# Patient Record
Sex: MALE | Race: WHITE | HISPANIC OR LATINO | Employment: FULL TIME | URBAN - METROPOLITAN AREA
[De-identification: names, ages, dates, MRNs, and addresses within clinical notes are randomized per-mention and may not be internally consistent; named-entity substitution may affect disease eponyms.]

---

## 2021-03-20 ENCOUNTER — HOSPITAL ENCOUNTER (EMERGENCY)
Facility: MEDICAL CENTER | Age: 26
End: 2021-03-20
Attending: EMERGENCY MEDICINE | Admitting: EMERGENCY MEDICINE

## 2021-03-20 VITALS
OXYGEN SATURATION: 99 % | RESPIRATION RATE: 12 BRPM | SYSTOLIC BLOOD PRESSURE: 117 MMHG | TEMPERATURE: 98 F | HEART RATE: 58 BPM | HEIGHT: 72 IN | DIASTOLIC BLOOD PRESSURE: 72 MMHG

## 2021-03-20 DIAGNOSIS — K04.7 DENTAL INFECTION: ICD-10-CM

## 2021-03-20 PROCEDURE — 700102 HCHG RX REV CODE 250 W/ 637 OVERRIDE(OP): Performed by: EMERGENCY MEDICINE

## 2021-03-20 PROCEDURE — 96372 THER/PROPH/DIAG INJ SC/IM: CPT

## 2021-03-20 PROCEDURE — A9270 NON-COVERED ITEM OR SERVICE: HCPCS | Performed by: EMERGENCY MEDICINE

## 2021-03-20 PROCEDURE — 700111 HCHG RX REV CODE 636 W/ 250 OVERRIDE (IP): Performed by: EMERGENCY MEDICINE

## 2021-03-20 PROCEDURE — 99283 EMERGENCY DEPT VISIT LOW MDM: CPT

## 2021-03-20 RX ORDER — CLINDAMYCIN HYDROCHLORIDE 150 MG/1
300 CAPSULE ORAL ONCE
Status: COMPLETED | OUTPATIENT
Start: 2021-03-20 | End: 2021-03-20

## 2021-03-20 RX ORDER — CLINDAMYCIN HYDROCHLORIDE 300 MG/1
300 CAPSULE ORAL 3 TIMES DAILY
Qty: 30 CAPSULE | Refills: 0 | Status: SHIPPED | OUTPATIENT
Start: 2021-03-20 | End: 2021-03-30

## 2021-03-20 RX ORDER — KETOROLAC TROMETHAMINE 30 MG/ML
30 INJECTION, SOLUTION INTRAMUSCULAR; INTRAVENOUS ONCE
Status: COMPLETED | OUTPATIENT
Start: 2021-03-20 | End: 2021-03-20

## 2021-03-20 RX ORDER — NAPROXEN 500 MG/1
500 TABLET ORAL 2 TIMES DAILY WITH MEALS
Qty: 30 TABLET | Refills: 0 | Status: SHIPPED | OUTPATIENT
Start: 2021-03-20

## 2021-03-20 RX ADMIN — KETOROLAC TROMETHAMINE 30 MG: 30 INJECTION, SOLUTION INTRAMUSCULAR at 22:25

## 2021-03-20 RX ADMIN — CLINDAMYCIN HYDROCHLORIDE 300 MG: 150 CAPSULE ORAL at 22:25

## 2021-03-20 ASSESSMENT — ENCOUNTER SYMPTOMS: FEVER: 0

## 2021-03-21 NOTE — ED NOTES
ERP rounded at bedside. Discharge instructions and follow up care discussed with patient. Patient given time to ask questions and verbalized understanding. Patient given 2 new prescriptions. Patient AOx4 at discharge and ambulatory to lobby with steady gait.

## 2021-03-21 NOTE — ED TRIAGE NOTES
Chief Complaint   Patient presents with   • Dental Pain     R upper jaw. Pt admits to having cavity in this area and pain associated with eating.      Pt arrived for above c/o. Pt admits buying pain medication and antibiotics from a person who is not a doctor for his dental pain.     /76   Pulse (!) 55   Temp 36.6 °C (97.8 °F) (Temporal)   Resp 14   Ht 1.829 m (6')   SpO2 99%

## 2021-03-21 NOTE — ED PROVIDER NOTES
"ED Provider Note    Scribed for Paola Babcock M.D. by Earline Hernandes. 3/20/2021, 9:38 PM.    Primary care provider: No primary care provider noted.  Means of arrival: Walk-in  History obtained from: Patient  History limited by: None    CHIEF COMPLAINT  Chief Complaint   Patient presents with   • Dental Pain     R upper jaw. Pt admits to having cavity in this area and pain associated with eating.        HPI  Mio Cody is a 25 y.o. male who presents to the Emergency Department for evaluation of acute right upper dental pain onset several weeks prior to arrival. He has had a cavity in his right second molar for about three years but has not seen a dentist recently. He reports taking Penicillin that he got from \"a store\" without relief.  Tylenol has been used with minimal alleviation. No fever.     REVIEW OF SYSTEMS  Review of Systems   Constitutional: Negative for fever.   HENT:        Positive for dental pain.       PAST MEDICAL HISTORY   none pertinent    SURGICAL HISTORY  patient denies any surgical history    SOCIAL HISTORY  Social History     Tobacco Use   • Smoking status: Current Every Day Smoker   • Smokeless tobacco: Never Used   Substance Use Topics   • Alcohol use: Not Currently   • Drug use: Not Currently      Social History     Substance and Sexual Activity   Drug Use Not Currently       FAMILY HISTORY  None pertinent    CURRENT MEDICATIONS  Home Medications     Reviewed by Susy Koch R.N. (Registered Nurse) on 03/20/21 at 2120  Med List Status: Not Addressed   Medication Last Dose Status        Patient Rod Taking any Medications                       ALLERGIES  No Known Allergies    PHYSICAL EXAM  VITAL SIGNS: /76   Pulse (!) 55   Temp 36.6 °C (97.8 °F) (Temporal)   Resp 14   Ht 1.829 m (6')   SpO2 99%   Vitals reviewed by myself.  Nursing note and vitals reviewed.  Constitutional: Well-developed and well-nourished. Uncomfortable appearing.  HENT: Head is normocephalic and " atraumatic.  No facial swelling noted.  Right upper molar noted to have cavity filling.  No gumline swelling or induration  Eyes: extra-ocular movements intact  Cardiovascular: Bradycardic rate and regular rhythm. No murmur heard.  Pulmonary/Chest: Breath sounds normal. No wheezes or rales.   Musculoskeletal: Extremities exhibit normal range of motion without edema or tenderness.   Neurological: Awake and alert  Skin: Skin is warm and dry. No rash.       REASSESSMENT    9:38 PM - Patient seen and examined at bedside. Discussed plan of care, including pain medication and antibiotics. Patient agrees to the plan of care. The patient will be medicated with Cleocin and Toradol.     11:15 PM - Patient was reevaluated at bedside. His pain has improved. Discussed discharge instructions and return precautions with the patient and they were cleared for discharge. Patient was given the opportunity to ask any further questions. He is comfortable with discharge at this time.      COURSE & MEDICAL DECISION MAKING  Nursing notes, VS, PMSFHx reviewed in chart.    Patient is a 25-year-old male who comes in for evaluation of dental pain.  No evidence of gumline abscess or facial abscess on exam.  His exam is most consistent with likely dental caries and possible periapical abscess.  He is otherwise well-appearing with vitals and normal limits.  I advised patient that we will start him on clindamycin and naproxen for management of his symptoms and have him follow-up with outpatient dentist.  Patient is amenable to this plan.  He is treated in the emergency department after which he feels improved.  He has been given strict return precautions and discharged in stable condition.      The patient will return for new or worsening symptoms and is stable at the time of discharge.    The patient is referred to a primary physician for blood pressure management, diabetic screening, and for all other preventative health  concerns.    DISPOSITION:  Patient will be discharged home in stable condition.    FOLLOW UP:  [address] [address] [phone]          OUTPATIENT MEDICATIONS:  New Prescriptions    CLINDAMYCIN (CLEOCIN) 300 MG CAP    Take 1 capsule by mouth 3 times a day for 10 days.    NAPROXEN (NAPROSYN) 500 MG TAB    Take 1 tablet by mouth 2 times a day, with meals.       FINAL IMPRESSION  1. Dental infection          Earline HENDRICKS (Mirza), am scribing for, and in the presence of, Paola Babcock M.D..    Electronically signed by: Earline Hernandes (Mirza), 3/20/2021    Paola HENDRICKS M.D. personally performed the services described in this documentation, as scribed by Earline Hernandes in my presence, and it is both accurate and complete.    The note accurately reflects work and decisions made by me.  Paola Babcock M.D.  3/21/2021  3:55 AM

## 2021-03-21 NOTE — ED NOTES
Pt reports improvement in pain following toradol admin. Dental resource list provided to patient.

## 2021-07-06 ENCOUNTER — HOSPITAL ENCOUNTER (EMERGENCY)
Facility: MEDICAL CENTER | Age: 26
End: 2021-07-06
Attending: EMERGENCY MEDICINE

## 2021-07-06 VITALS
BODY MASS INDEX: 17.86 KG/M2 | WEIGHT: 131.84 LBS | DIASTOLIC BLOOD PRESSURE: 74 MMHG | TEMPERATURE: 98 F | HEART RATE: 79 BPM | SYSTOLIC BLOOD PRESSURE: 108 MMHG | RESPIRATION RATE: 16 BRPM | HEIGHT: 72 IN | OXYGEN SATURATION: 98 %

## 2021-07-06 DIAGNOSIS — R39.89 GENITAL SORE: ICD-10-CM

## 2021-07-06 DIAGNOSIS — N34.2 URETHRITIS: ICD-10-CM

## 2021-07-06 DIAGNOSIS — A64 STI (SEXUALLY TRANSMITTED INFECTION): ICD-10-CM

## 2021-07-06 LAB — TREPONEMA PALLIDUM IGG+IGM AB [PRESENCE] IN SERUM OR PLASMA BY IMMUNOASSAY: NORMAL

## 2021-07-06 PROCEDURE — 99284 EMERGENCY DEPT VISIT MOD MDM: CPT | Mod: EDC

## 2021-07-06 PROCEDURE — 700111 HCHG RX REV CODE 636 W/ 250 OVERRIDE (IP): Performed by: EMERGENCY MEDICINE

## 2021-07-06 PROCEDURE — 96372 THER/PROPH/DIAG INJ SC/IM: CPT | Mod: EDC

## 2021-07-06 PROCEDURE — 87591 N.GONORRHOEAE DNA AMP PROB: CPT

## 2021-07-06 PROCEDURE — 700102 HCHG RX REV CODE 250 W/ 637 OVERRIDE(OP): Performed by: EMERGENCY MEDICINE

## 2021-07-06 PROCEDURE — 87491 CHLMYD TRACH DNA AMP PROBE: CPT

## 2021-07-06 PROCEDURE — 86780 TREPONEMA PALLIDUM: CPT

## 2021-07-06 PROCEDURE — A9270 NON-COVERED ITEM OR SERVICE: HCPCS | Performed by: EMERGENCY MEDICINE

## 2021-07-06 PROCEDURE — 36415 COLL VENOUS BLD VENIPUNCTURE: CPT

## 2021-07-06 RX ORDER — AZITHROMYCIN 250 MG/1
1000 TABLET, FILM COATED ORAL ONCE
Status: COMPLETED | OUTPATIENT
Start: 2021-07-06 | End: 2021-07-06

## 2021-07-06 RX ORDER — CEFTRIAXONE 500 MG/1
500 INJECTION, POWDER, FOR SOLUTION INTRAMUSCULAR; INTRAVENOUS ONCE
Status: COMPLETED | OUTPATIENT
Start: 2021-07-06 | End: 2021-07-06

## 2021-07-06 RX ADMIN — AZITHROMYCIN MONOHYDRATE 1000 MG: 250 TABLET ORAL at 11:21

## 2021-07-06 RX ADMIN — CEFTRIAXONE SODIUM 500 MG: 500 INJECTION, POWDER, FOR SOLUTION INTRAMUSCULAR; INTRAVENOUS at 11:21

## 2021-07-06 RX ADMIN — PENICILLIN G BENZATHINE 2.4 MILLION UNITS: 1200000 INJECTION, SUSPENSION INTRAMUSCULAR at 11:22

## 2021-07-06 NOTE — ED NOTES
[id number] used to translate discharge instructions.    Mio Cody has been discharged from the Emergency Room.    Discharge instructions, which include signs and symptoms to monitor at home for, as well as detailed information regarding STI provided.  All questions and concerns addressed at this time.      Patient leaves ER in no apparent distress. This RN provided education regarding returning to the ER for any new concerns or changes in patient's condition.      /74   Pulse 79   Temp 36.7 °C (98 °F) (Temporal)   Resp 16   Ht 1.829 m (6')   Wt 59.8 kg (131 lb 13.4 oz)   SpO2 98%   BMI 17.88 kg/m²

## 2021-07-06 NOTE — ED PROVIDER NOTES
ED Provider  Scribed for Brandt Dugan D.O. by Shari Hackett. 7/6/2021  10:36 AM    Means of arrival: Walk in   History obtained from: Patient  History limited by: Online [place] Interpretor used    CHIEF COMPLAINT  Chief Complaint   Patient presents with    Exposure to STD       HPI  Mio Cody is a 25 y.o. male who presents to the ED for a possible STD exposure with an onset of last week. He notes his female partner informed him that they may have been exposed to an STD, but her exposure was not confirmed. He describes he has some yellow and white colored discharge from his penis yesterday and feels a burning sensation when he urinates. He denies being seen for his symptoms in the past, and is requesting a STD test as a precaution. Exacerbating factors include urinating. No alleviating factors were identified. He reports associated dysuria, penile discharge x1 day, and a genital sore on his penis. He denies any associated fever, chills, or sweats.     REVIEW OF SYSTEMS  See HPI for further details. All other systems are otherwise negative.     PAST MEDICAL HISTORY   None noted    SOCIAL HISTORY  Social History     Tobacco Use    Smoking status: Current Every Day Smoker    Smokeless tobacco: Never Used   Substance and Sexual Activity    Alcohol use: Yes    Drug use: Yes     Comment: marijuana    Sexual activity: Female partner       SURGICAL HISTORY  patient denies any surgical history    CURRENT MEDICATIONS  Home Medications       Reviewed by Romain Campbell R.N. (Registered Nurse) on 07/06/21 at 1006  Med List Status: Complete     Medication Last Dose Status   naproxen (NAPROSYN) 500 MG Tab  Active                    ALLERGIES  No Known Allergies    PHYSICAL EXAM  VITAL SIGNS: /73   Pulse 82   Temp 36.1 °C (97 °F) (Temporal)   Resp 16   Ht 1.829 m (6')   Wt 59.8 kg (131 lb 13.4 oz)   SpO2 98%   BMI 17.88 kg/m²   Constitutional: Alert in no apparent distress.  HENT: No signs of  trauma.   Eyes: Conjunctiva normal, Non-icteric.   Neck: Normal range of motion, No tenderness, Supple.  Cardiovascular: Regular rate and rhythm, no murmurs.   Thorax & Lungs: No respiratory distress,   Abdomen: Bowel sounds normal, Soft, No tenderness, No masses, No pulsatile masses. No peritoneal signs.  : Cloudy white and yellow discharge from the penis, sore/ulceration approximately 2 mm at the anterior portion of the glands with no drainage, no inguinal adenopathy.   Skin: Warm, Dry, normal color.   Extremities: No edema, No tenderness.   Musculoskeletal: Good range of motion in all major joints. No tenderness to palpation or major deformities noted.   Neurologic: Alert and oriented x4, Normal motor function, Normal sensory function, No focal deficits noted.   Psychiatric: Affect normal, Judgment normal, Mood normal.     DIAGNOSTIC STUDIES / PROCEDURES    LABS  Results for orders placed or performed during the hospital encounter of 07/06/21   Chlamydia/GC PCR Urine Or Swab    Specimen: Urine, First Catch   Result Value Ref Range    Source Urine    T.PALLIDUM AB EIA   Result Value Ref Range    Syphilis, Treponemal Qual Non-Reactive Non-Reactive     All labs reviewed by me.    COURSE  Pertinent Labs & Imaging studies reviewed. (See chart for details)    10:36 AM - Patient seen and examined at bedside. Discussed plan of care. I informed him we will order labs to evaluate for STD's. He will also be treated with medications for possible STD's. Patient understands and agrees to plan of care.  Ordered for T. Pallidum AB EIA, and Chlamydia/GC PCR Urine to evaluate his symptoms.     10:52 AM - Patient will be treated with Zithromax 1000 mg tablet, Rocephin 500 mg injection, and Penicillin 2.4 unit injection for his STD exposure.     12:50 PM - Patient was reevaluated at bedside. He is resting in bed with no new complaints at this time. Discussed lab results with the patient and informed them that they were reassuring.  The patient will return for new or worsening symptoms and is stable at the time of discharge. He will follow up with [facility]. Patient verbalizes understanding and agreement to this plan of care.       MEDICAL DECISION MAKING  This is a 25 y.o. male who presents with urethral drainage and a sore on the glans of the penis.  This is concerning for urethritis, possibly syphilis.  He is treated for all 3.  While cultures are pending.    DISPOSITION:  Patient will be discharged home in stable condition.    FOLLOW UP:  [address] [address] [phone]  In 1 week    FINAL IMPRESSION  1. STI (sexually transmitted infection)    2. Urethritis    3. Genital sore         IShari (Scribe), am scribing for, and in the presence of, Brandt Dugan D.O..    Electronically signed by: Shari Hackett (Scribe), 7/6/2021    IBrandt D.O. personally performed the services described in this documentation, as scribed by Shari Hackett in my presence, and it is both accurate and complete.    C    The note accurately reflects work and decisions made by me.  Brandt Dugan D.O.  7/6/2021  5:43 PM

## 2021-07-06 NOTE — ED TRIAGE NOTES
Patient ambulatory to triage with with c/o STD exposure. Patient reports white-yellow penile discharge x 2 days.    Patient given urine specimen supplies.   Patient to lobby and instructed to inform staff of any needs.      #[id number]

## 2021-07-06 NOTE — DISCHARGE INSTRUCTIONS
No sex or alcohol for 2 weeks to give this some time to resolve.  Always use a condom when you have sex to prevent this from reoccurring.

## 2021-07-06 NOTE — ED NOTES
"First interaction with patient.  Assumed care at this time.   [id number] used to translate interaction.  Patient presents to the ER today for concerns of an STI.  He reports penile discharge starting yesterday.  He is requesting \"all of the tests,\" include hepatitis, chlamydia, syphilis, and gonorrhea.  Urine collected and sent to lab.  Patient verified correct patient name and  on labeled specimen.  Patient informed of estimated lab result wait times, verbalized understanding.    Gown provided, patient instructed to changed prior to ERP evaluation.  NPO status explained by this RN.  Call light provided.  Chart up for ERP.  "

## 2021-07-07 LAB
C TRACH DNA SPEC QL NAA+PROBE: NEGATIVE
N GONORRHOEA DNA SPEC QL NAA+PROBE: POSITIVE
SPECIMEN SOURCE: ABNORMAL

## 2021-07-08 NOTE — ED NOTES
ED Positive Culture Follow-up/Notification Note:    Date: 7/8/2021     Patient seen in the ED on 7/6/2021 for exposure to STD .   1. STI (sexually transmitted infection)    2. Urethritis    3. Genital sore       Discharge Medication List as of 7/6/2021 10:58 AM        Bicillin-LA 2.4 million units IM x1, Given Azithromycin 1000 mg PO x1 and Ceftriaxone 500 mg IM x1 in ED     Allergies: Patient has no known allergies.     Vitals:    07/06/21 0956 07/06/21 0959 07/06/21 1132   BP: 107/73  108/74   Pulse: 82  79   Resp: 16  16   Temp: 36.1 °C (97 °F)  36.7 °C (98 °F)   TempSrc: Temporal  Temporal   SpO2: 98%  98%   Weight:  59.8 kg (131 lb 13.4 oz)    Height: 1.829 m (6')         Final cultures:   Results     Procedure Component Value Units Date/Time    Chlamydia/GC PCR Urine Or Swab [[id number]]  (Abnormal) Collected: 07/06/21 1032    Order Status: Completed Specimen: Urine, First Catch Updated: 07/07/21 2208     C. trachomatis by PCR Negative     N. gonorrhoeae by PCR POSITIVE     Source Urine          Plan:   Patient treated for gonorrhea in the ER. No additional treatment necessary. Spoke to the patient via telephone. Counseled the patient to abstain from sexual intercourse 7 days after antibiotic therapy is completed, to notify any sexual partners within the last 60 days to go the the [facility] for testing, to seek further HIV/STD testing, and to seek medical attention if symptoms persist. Patient voiced understanding and did not have any other questions.    Interpretor used ID [id number]    Edwin Maciel, PharmD

## 2021-07-12 ENCOUNTER — HOSPITAL ENCOUNTER (EMERGENCY)
Facility: MEDICAL CENTER | Age: 26
End: 2021-07-12
Attending: EMERGENCY MEDICINE | Admitting: EMERGENCY MEDICINE

## 2021-07-12 VITALS
TEMPERATURE: 99 F | DIASTOLIC BLOOD PRESSURE: 81 MMHG | RESPIRATION RATE: 18 BRPM | SYSTOLIC BLOOD PRESSURE: 128 MMHG | BODY MASS INDEX: 17.92 KG/M2 | HEIGHT: 72 IN | HEART RATE: 67 BPM | OXYGEN SATURATION: 94 % | WEIGHT: 132.28 LBS

## 2021-07-12 DIAGNOSIS — Z86.19 HISTORY OF GONORRHEA: ICD-10-CM

## 2021-07-12 DIAGNOSIS — Z09 FOLLOW-UP EXAM: ICD-10-CM

## 2021-07-12 PROCEDURE — 99282 EMERGENCY DEPT VISIT SF MDM: CPT

## 2021-07-12 ASSESSMENT — LIFESTYLE VARIABLES
TOTAL SCORE: 0
HAVE YOU EVER FELT YOU SHOULD CUT DOWN ON YOUR DRINKING: NO
DO YOU DRINK ALCOHOL: NO
HAVE PEOPLE ANNOYED YOU BY CRITICIZING YOUR DRINKING: NO
CONSUMPTION TOTAL: INCOMPLETE
EVER FELT BAD OR GUILTY ABOUT YOUR DRINKING: NO
TOTAL SCORE: 0
EVER HAD A DRINK FIRST THING IN THE MORNING TO STEADY YOUR NERVES TO GET RID OF A HANGOVER: NO
TOTAL SCORE: 0

## 2021-07-13 NOTE — ED TRIAGE NOTES
Chief Complaint   Patient presents with   • Follow-Up      [id number] used for triage. Per pt he was told to come back and receive another dose of medication for exposure to STD. pt currently denies symptoms.        Explained to pt triage process, made pt aware to tell this RN/staff of any changes/concerns, pt verbalized understanding of process and instructions given. Pt to ER [facility].

## 2021-07-13 NOTE — ED PROVIDER NOTES
ED Provider Note    CHIEF COMPLAINT  Chief Complaint   Patient presents with   • Follow-Up      [id number] used for triage. Per pt he was told to come back and receive another dose of medication for exposure to STD. pt currently denies symptoms.      Seen at 7:41 PM    HPI  Mio Cody is a 25 y.o. male who presents to receive a second shot.  He states that he was told by the hospital a few days ago to return to the emergency department to complete his shots for an STD.  He is not entirely sure what the shot was or who called him.  He was seen here for gonorrhea a week ago.  His dysuria and urethral discharge resolved completely.  He does not have any symptoms at this time.    All history is obtained from the translation tablet.    REVIEW OF SYSTEMS  See HPI  PAST MEDICAL HISTORY   Denies.    SOCIAL HISTORY  Social History     Tobacco Use   • Smoking status: Current Every Day Smoker   • Smokeless tobacco: Never Used   Substance and Sexual Activity   • Alcohol use: Yes   • Drug use: Yes     Comment: marijuana   • Sexual activity: Not on file       SURGICAL HISTORY  patient denies any surgical history    CURRENT MEDICATIONS  Reviewed.  See Encounter Summary.     ALLERGIES  No Known Allergies    PHYSICAL EXAM  VITAL SIGNS: /81   Pulse 67   Temp 37.2 °C (99 °F) (Temporal)   Resp 18   Ht 1.829 m (6')   Wt 60 kg (132 lb 4.4 oz)   SpO2 94%   BMI 17.94 kg/m²   Constitutional: Awake, alert in no apparent distress.  HENT: Normocephalic, Bilateral external ears normal. Nose normal.   Eyes: Conjunctiva normal, non-icteric, EOMI.    Thorax & Lungs: Easy unlabored respirations  Cardiovascular:    Abdomen:  No distention  Skin: Visualized skin is  Dry, No erythema, No rash.   Extremities:   atraumatic   Neurologic: Alert, Grossly non-focal.   Psychiatric: Affect and Mood normal    RADIOLOGY  No orders to display       Nursing notes and vital signs were reviewed. (See chart for  details)    Decision Making:  This is a pleasant 25 y.o. year old male who presents to possibly get a second injection for gonorrhea?  I reviewed the note from pharmacy, they did not recommend return visit, I suspect that something was lost in translation, although this was apparently done with the translation tablet.  He has no symptoms at this time, there is no indication to repeat the STD screening from last week.  There is also no need to do further injections.  He was treated with 2,400,000 units of Bicillin and his RPR was negative.  He was also treated with 500 mg of Rocephin, gonorrhea positive.  This should be ample treatment, particular given that he no longer has any symptoms.    DISPOSITION:  Patient will be discharged home in good condition.    Discharge Medications:  New Prescriptions    No medications on file       The patient was discharged home (see d/c instructions) and told to return immediately for any signs or symptoms listed, or any worsening at all.  The patient verbally agreed to the discharge precautions and follow-up plan which is documented in EPIC.        FINAL IMPRESSION  1. Follow-up exam    2. History of gonorrhea

## 2021-07-13 NOTE — ED NOTES
Pt discharged home. Pt in possession of belongings. Pt provided discharge education and information pertaining to medications and follow up appointments. Pt received copy of discharge instructions and verbalized understanding. /81   Pulse 67   Temp 37.2 °C (99 °F) (Temporal)   Resp 18   Ht 1.829 m (6')   Wt 60 kg (132 lb 4.4 oz)   SpO2 94%   BMI 17.94 kg/m²